# Patient Record
Sex: FEMALE | Race: BLACK OR AFRICAN AMERICAN | NOT HISPANIC OR LATINO | Employment: FULL TIME | RURAL
[De-identification: names, ages, dates, MRNs, and addresses within clinical notes are randomized per-mention and may not be internally consistent; named-entity substitution may affect disease eponyms.]

---

## 2021-06-16 PROBLEM — E66.811 OBESITY (BMI 30.0-34.9): Status: ACTIVE | Noted: 2021-06-16

## 2023-03-07 PROBLEM — E66.812 CLASS 2 OBESITY DUE TO EXCESS CALORIES WITH BODY MASS INDEX (BMI) OF 35.0 TO 35.9 IN ADULT: Status: ACTIVE | Noted: 2023-03-07

## 2024-08-05 ENCOUNTER — HOSPITAL ENCOUNTER (OUTPATIENT)
Dept: RADIOLOGY | Facility: HOSPITAL | Age: 49
Discharge: HOME | End: 2024-08-05
Attending: RADIOLOGY
Payer: COMMERCIAL

## 2024-08-05 DIAGNOSIS — Z12.31 VISIT FOR SCREENING MAMMOGRAM: ICD-10-CM

## 2024-08-05 DIAGNOSIS — R92.8 ABNORMAL MAMMOGRAM: ICD-10-CM

## 2024-08-05 PROCEDURE — 77067 SCR MAMMO BI INCL CAD: CPT | Mod: 26,,, | Performed by: RADIOLOGY

## 2024-08-05 PROCEDURE — 77063 BREAST TOMOSYNTHESIS BI: CPT | Mod: TC

## 2024-08-05 PROCEDURE — 76641 ULTRASOUND BREAST COMPLETE: CPT | Mod: 26,50,, | Performed by: RADIOLOGY

## 2024-08-05 PROCEDURE — 77063 BREAST TOMOSYNTHESIS BI: CPT | Mod: 26,,, | Performed by: RADIOLOGY

## 2024-08-05 PROCEDURE — 77067 SCR MAMMO BI INCL CAD: CPT | Mod: TC

## 2024-08-05 PROCEDURE — 76641 ULTRASOUND BREAST COMPLETE: CPT | Mod: TC,50

## 2024-08-06 RX ORDER — QUETIAPINE FUMARATE 25 MG/1
25 TABLET, FILM COATED ORAL NIGHTLY
Qty: 30 TABLET | Refills: 0 | Status: SHIPPED | OUTPATIENT
Start: 2024-08-06

## 2024-08-15 ENCOUNTER — OFFICE VISIT (OUTPATIENT)
Dept: FAMILY MEDICINE | Facility: CLINIC | Age: 49
End: 2024-08-15
Payer: COMMERCIAL

## 2024-08-15 VITALS
HEART RATE: 98 BPM | OXYGEN SATURATION: 99 % | SYSTOLIC BLOOD PRESSURE: 138 MMHG | BODY MASS INDEX: 35.52 KG/M2 | DIASTOLIC BLOOD PRESSURE: 88 MMHG | WEIGHT: 221 LBS | TEMPERATURE: 98 F | HEIGHT: 66 IN

## 2024-08-15 DIAGNOSIS — Z11.59 ENCOUNTER FOR HEPATITIS C SCREENING TEST FOR LOW RISK PATIENT: ICD-10-CM

## 2024-08-15 DIAGNOSIS — Z23 NEED FOR VACCINATION: ICD-10-CM

## 2024-08-15 DIAGNOSIS — M25.571 CHRONIC PAIN OF RIGHT ANKLE: ICD-10-CM

## 2024-08-15 DIAGNOSIS — G89.29 CHRONIC PAIN OF RIGHT ANKLE: ICD-10-CM

## 2024-08-15 DIAGNOSIS — I10 ESSENTIAL HYPERTENSION: Primary | ICD-10-CM

## 2024-08-15 PROCEDURE — 1159F MED LIST DOCD IN RCRD: CPT | Mod: CPTII,,, | Performed by: FAMILY MEDICINE

## 2024-08-15 PROCEDURE — 90715 TDAP VACCINE 7 YRS/> IM: CPT | Mod: ,,, | Performed by: FAMILY MEDICINE

## 2024-08-15 PROCEDURE — 80061 LIPID PANEL: CPT | Mod: ,,, | Performed by: CLINICAL MEDICAL LABORATORY

## 2024-08-15 PROCEDURE — 4010F ACE/ARB THERAPY RXD/TAKEN: CPT | Mod: CPTII,,, | Performed by: FAMILY MEDICINE

## 2024-08-15 PROCEDURE — 85025 COMPLETE CBC W/AUTO DIFF WBC: CPT | Mod: ,,, | Performed by: CLINICAL MEDICAL LABORATORY

## 2024-08-15 PROCEDURE — 3008F BODY MASS INDEX DOCD: CPT | Mod: CPTII,,, | Performed by: FAMILY MEDICINE

## 2024-08-15 PROCEDURE — 90471 IMMUNIZATION ADMIN: CPT | Mod: ,,, | Performed by: FAMILY MEDICINE

## 2024-08-15 PROCEDURE — 86803 HEPATITIS C AB TEST: CPT | Mod: ,,, | Performed by: CLINICAL MEDICAL LABORATORY

## 2024-08-15 PROCEDURE — 3079F DIAST BP 80-89 MM HG: CPT | Mod: CPTII,,, | Performed by: FAMILY MEDICINE

## 2024-08-15 PROCEDURE — 80053 COMPREHEN METABOLIC PANEL: CPT | Mod: ,,, | Performed by: CLINICAL MEDICAL LABORATORY

## 2024-08-15 PROCEDURE — 3075F SYST BP GE 130 - 139MM HG: CPT | Mod: CPTII,,, | Performed by: FAMILY MEDICINE

## 2024-08-15 PROCEDURE — 99214 OFFICE O/P EST MOD 30 MIN: CPT | Mod: 25,,, | Performed by: FAMILY MEDICINE

## 2024-08-15 RX ORDER — AMITRIPTYLINE HYDROCHLORIDE 25 MG/1
25 TABLET, FILM COATED ORAL NIGHTLY
Qty: 90 TABLET | Refills: 1 | Status: SHIPPED | OUTPATIENT
Start: 2024-08-15

## 2024-08-15 RX ORDER — HYDROCHLOROTHIAZIDE 12.5 MG/1
12.5 TABLET ORAL 2 TIMES DAILY
Qty: 180 TABLET | Refills: 1 | Status: SHIPPED | OUTPATIENT
Start: 2024-08-15

## 2024-08-15 RX ORDER — PROPRANOLOL HYDROCHLORIDE 20 MG/1
20 TABLET ORAL NIGHTLY
Qty: 90 TABLET | Refills: 1 | Status: SHIPPED | OUTPATIENT
Start: 2024-08-15

## 2024-08-15 RX ORDER — QUETIAPINE FUMARATE 25 MG/1
25 TABLET, FILM COATED ORAL NIGHTLY
Qty: 90 TABLET | Refills: 1 | Status: SHIPPED | OUTPATIENT
Start: 2024-08-15

## 2024-08-15 RX ORDER — AMLODIPINE AND BENAZEPRIL HYDROCHLORIDE 10; 20 MG/1; MG/1
1 CAPSULE ORAL DAILY
Qty: 90 CAPSULE | Refills: 1 | Status: SHIPPED | OUTPATIENT
Start: 2024-08-15

## 2024-08-16 LAB
ALBUMIN SERPL BCP-MCNC: 3.9 G/DL (ref 3.5–5)
ALBUMIN/GLOB SERPL: 1 {RATIO}
ALP SERPL-CCNC: 117 U/L (ref 39–100)
ALT SERPL W P-5'-P-CCNC: 22 U/L (ref 13–56)
ANION GAP SERPL CALCULATED.3IONS-SCNC: 9 MMOL/L (ref 7–16)
AST SERPL W P-5'-P-CCNC: 9 U/L (ref 15–37)
BASOPHILS # BLD AUTO: 0.02 K/UL (ref 0–0.2)
BASOPHILS NFR BLD AUTO: 0.2 % (ref 0–1)
BILIRUB SERPL-MCNC: 0.2 MG/DL (ref ?–1.2)
BUN SERPL-MCNC: 16 MG/DL (ref 7–18)
BUN/CREAT SERPL: 16 (ref 6–20)
CALCIUM SERPL-MCNC: 10 MG/DL (ref 8.5–10.1)
CHLORIDE SERPL-SCNC: 109 MMOL/L (ref 98–107)
CHOLEST SERPL-MCNC: 188 MG/DL (ref 0–200)
CHOLEST/HDLC SERPL: 2 {RATIO}
CO2 SERPL-SCNC: 27 MMOL/L (ref 21–32)
CREAT SERPL-MCNC: 0.98 MG/DL (ref 0.55–1.02)
DIFFERENTIAL METHOD BLD: ABNORMAL
EGFR (NO RACE VARIABLE) (RUSH/TITUS): 71 ML/MIN/1.73M2
EOSINOPHIL # BLD AUTO: 0.08 K/UL (ref 0–0.5)
EOSINOPHIL NFR BLD AUTO: 0.7 % (ref 1–4)
ERYTHROCYTE [DISTWIDTH] IN BLOOD BY AUTOMATED COUNT: 14.2 % (ref 11.5–14.5)
GLOBULIN SER-MCNC: 3.9 G/DL (ref 2–4)
GLUCOSE SERPL-MCNC: 93 MG/DL (ref 74–106)
HCT VFR BLD AUTO: 41 % (ref 38–47)
HCV AB SER QL: NORMAL
HDLC SERPL-MCNC: 94 MG/DL (ref 40–60)
HGB BLD-MCNC: 12.8 G/DL (ref 12–16)
IMM GRANULOCYTES # BLD AUTO: 0.04 K/UL (ref 0–0.04)
IMM GRANULOCYTES NFR BLD: 0.3 % (ref 0–0.4)
LDLC SERPL CALC-MCNC: 81 MG/DL
LYMPHOCYTES # BLD AUTO: 3.43 K/UL (ref 1–4.8)
LYMPHOCYTES NFR BLD AUTO: 28.4 % (ref 27–41)
MCH RBC QN AUTO: 31.6 PG (ref 27–31)
MCHC RBC AUTO-ENTMCNC: 31.2 G/DL (ref 32–36)
MCV RBC AUTO: 101.2 FL (ref 80–96)
MONOCYTES # BLD AUTO: 0.94 K/UL (ref 0–0.8)
MONOCYTES NFR BLD AUTO: 7.8 % (ref 2–6)
MPC BLD CALC-MCNC: 10.8 FL (ref 9.4–12.4)
NEUTROPHILS # BLD AUTO: 7.56 K/UL (ref 1.8–7.7)
NEUTROPHILS NFR BLD AUTO: 62.6 % (ref 53–65)
NONHDLC SERPL-MCNC: 94 MG/DL
NRBC # BLD AUTO: 0 X10E3/UL
NRBC, AUTO (.00): 0 %
PLATELET # BLD AUTO: 324 K/UL (ref 150–400)
POTASSIUM SERPL-SCNC: 3.9 MMOL/L (ref 3.5–5.1)
PROT SERPL-MCNC: 7.8 G/DL (ref 6.4–8.2)
RBC # BLD AUTO: 4.05 M/UL (ref 4.2–5.4)
SODIUM SERPL-SCNC: 141 MMOL/L (ref 136–145)
TRIGL SERPL-MCNC: 65 MG/DL (ref 35–150)
VLDLC SERPL-MCNC: 13 MG/DL
WBC # BLD AUTO: 12.07 K/UL (ref 4.5–11)

## 2024-08-19 NOTE — PROGRESS NOTES
Hmea Brown MD   [facility]  [address] [place] [address]     PATIENT NAME: Sree COLEMAN  : 1975  DATE: 8/15/24  MRN: [id number]      Billing Provider: Hema Brown MD  Level of Service: MO OFFICE/OUTPT VISIT, EST, LEVL IV, 30-39 MIN  Patient PCP Information       Provider PCP Type    Hema Brown MD General            Reason for Visit / Chief Complaint: Hypertension (6 month check up, labs and med refills) and Ankle Pain (C/o right ankle pain/swelling that started 2 months ago. She was seen on a Friday by Ana Pozo NP. Xray at [facility] showed mild osteoarthrosis. States she was given 2 shots and indomethacin. Requesting shots today.)         History of Present Illness / Problem Focused Workflow     Sree COLEMAN presents to the clinic with Hypertension (6 month check up, labs and med refills) and Ankle Pain (C/o right ankle pain/swelling that started 2 months ago. She was seen on a Friday by Ana Pozo NP. Xray at [facility] showed mild osteoarthrosis. States she was given 2 shots and indomethacin. Requesting shots today.)     HPI    Review of Systems     Review of Systems   Constitutional:  Negative for activity change, appetite change, fatigue and fever.   HENT:  Negative for nasal congestion, ear pain, hearing loss, sinus pressure/congestion and sore throat.    Respiratory:  Negative for cough, chest tightness and shortness of breath.    Cardiovascular:  Negative for chest pain and palpitations.   Gastrointestinal:  Negative for abdominal pain and fecal incontinence.   Genitourinary:  Negative for bladder incontinence and difficulty urinating.   Musculoskeletal:  Negative for arthralgias.   Integumentary:  Negative for rash.   Neurological:  Negative for dizziness and headaches.        Medical / Social / Family History     Past Medical History:   Diagnosis Date    Anxiety disorder, unspecified      Depression     Hypertension     Panic attack     Vasovagal attack 04/24/2024    Vasovagal attack occurred after office endocervical curettage and required emergency room evaluation - labs within normal limits as well as CT scan of the neck and had patient was discharged home; patient has a history of vasovagal attacks according to daughter.       Past Surgical History:   Procedure Laterality Date    EXCISION OF MEDIAL MENISCUS OF KNEE Left 08/10/2021    Procedure: MENISCECTOMY, KNEE, partial MEDIAL;  Surgeon: Erick Alonso MD;  Location: [facility];  Service: Orthopedics;  Laterality: Left;  PARTIAL MEDICAL MENISCECTOMY    KNEE ARTHROSCOPY W/ DEBRIDEMENT Left 08/10/2021    Procedure: ARTHROSCOPY, KNEE, WITH DEBRIDEMENT of patella;  Surgeon: Erick Alonso MD;  Location: [facility];  Service: Orthopedics;  Laterality: Left;  shaving of patella    KNEE ARTHROSCOPY W/ PLICA EXCISION Left 08/10/2021    Procedure: EXCISION, PLICA, KNEE, ARTHROSCOPIC;  Surgeon: Erick Alonso MD;  Location: [facility];  Service: Orthopedics;  Laterality: Left;  MSP EXCISION    RECONSTRUCTION OF ANTERIOR CRUCIATE LIGAMENT USING GRAFT Left 08/10/2021    Procedure: RECONSTRUCTION, KNEE, ACL, USING GRAFT TRANSFIX allograft;  Surgeon: Erick Alonso MD;  Location: [facility];  Service: Orthopedics;  Laterality: Left;    TUBAL LIGATION         Social History  Sree COLEMAN  reports that she has been smoking vaping w/o nicotine. She has never used smokeless tobacco. She reports current alcohol use of about 3.0 standard drinks of alcohol per week. She reports that she does not use drugs.    Family History  Sree COLEMAN  family history includes Hypertension in her mother and sister.    Medications and Allergies     Medications  Outpatient Medications Marked as Taking for the 8/15/24 encounter (Office Visit) with Hema Brown MD   Medication Sig Dispense Refill     [DISCONTINUED] amitriptyline (ELAVIL) 25 MG tablet Take 1 tablet (25 mg total) by mouth every evening. 90 tablet 3    [DISCONTINUED] amlodipine-benazepril 10-20mg (LOTREL) 10-20 mg per capsule Take 1 capsule by mouth once daily. 90 capsule 3    [DISCONTINUED] hydroCHLOROthiazide (HYDRODIURIL) 12.5 MG Tab Take 1 tablet (12.5 mg total) by mouth 2 (two) times a day. 180 tablet 3    [DISCONTINUED] propranoloL (INDERAL) 20 MG tablet Take 20 mg by mouth nightly.      [DISCONTINUED] QUEtiapine (SEROQUEL) 25 MG Tab Take 1 tablet (25 mg total) by mouth nightly. 30 tablet 0       Allergies  Review of patient's allergies indicates:  No Known Allergies    Physical Examination     Vitals:    08/15/24 1520   BP: 138/88   Pulse: 98   Temp: 98 °F (36.7 °C)     Physical Exam  Constitutional:       General: She is not in acute distress.     Appearance: She is not ill-appearing.   HENT:      Head: Normocephalic and atraumatic.      Right Ear: Tympanic membrane and ear canal normal.      Left Ear: Tympanic membrane and ear canal normal.      Nose: Nose normal. No congestion or rhinorrhea.   Eyes:      Pupils: Pupils are equal, round, and reactive to light.   Cardiovascular:      Rate and Rhythm: Normal rate and regular rhythm.      Pulses: Normal pulses.      Heart sounds: No murmur heard.  Pulmonary:      Effort: No respiratory distress.      Breath sounds: No wheezing, rhonchi or rales.   Abdominal:      General: Bowel sounds are normal.      Palpations: Abdomen is soft.      Tenderness: There is no abdominal tenderness.      Hernia: No hernia is present.   Musculoskeletal:         General: Tenderness (right lateral ankle) present.      Cervical back: Normal range of motion and neck supple.   Lymphadenopathy:      Cervical: No cervical adenopathy.   Skin:     General: Skin is warm and dry.   Neurological:      Mental Status: She is alert.   Psychiatric:         Behavior: Behavior normal.         Thought Content: Thought content  normal.          Assessment and Plan (including Health Maintenance)   :    Plan:         Health Maintenance Due   Topic Date Due    Pneumococcal Vaccines (Age 0-64) (1 of 2 - PCV) Never done       Problem List Items Addressed This Visit    None  Visit Diagnoses       Essential hypertension    -  Primary    Relevant Orders    CBC Auto Differential (Completed)    Comprehensive Metabolic Panel (Completed)    Lipid Panel (Completed)    Encounter for hepatitis C screening test for low risk patient        Relevant Orders    Hepatitis C Antibody (Completed)    Need for vaccination        Relevant Medications    Tdap (BOOSTRIX) vaccine injection 0.5 mL (Completed)    Chronic pain of right ankle              Essential hypertension  -     CBC Auto Differential; Future; Expected date: 08/15/2024  -     Comprehensive Metabolic Panel; Future; Expected date: 08/15/2024  -     Lipid Panel; Future; Expected date: 08/15/2024    Encounter for hepatitis C screening test for low risk patient  -     Hepatitis C Antibody; Future; Expected date: 08/15/2024    Need for vaccination  -     Tdap (BOOSTRIX) vaccine injection 0.5 mL    Chronic pain of right ankle    Other orders  -     amlodipine-benazepril 10-20mg (LOTREL) 10-20 mg per capsule; Take 1 capsule by mouth once daily.  Dispense: 90 capsule; Refill: 1  -     hydroCHLOROthiazide (HYDRODIURIL) 12.5 MG Tab; Take 1 tablet (12.5 mg total) by mouth 2 (two) times a day.  Dispense: 180 tablet; Refill: 1  -     amitriptyline (ELAVIL) 25 MG tablet; Take 1 tablet (25 mg total) by mouth every evening.  Dispense: 90 tablet; Refill: 1  -     QUEtiapine (SEROQUEL) 25 MG Tab; Take 1 tablet (25 mg total) by mouth nightly.  Dispense: 90 tablet; Refill: 1  -     propranoloL (INDERAL) 20 MG tablet; Take 1 tablet (20 mg total) by mouth nightly.  Dispense: 90 tablet; Refill: 1       Health Maintenance Topics with due status: Not Due       Topic Last Completion Date    Influenza Vaccine 10/13/2022     Colorectal Cancer Screening 02/17/2023    Hemoglobin A1c (Diabetic Prevention Screening) 07/10/2023    Cervical Cancer Screening 04/09/2024    Mammogram 08/05/2024    TETANUS VACCINE 08/15/2024    Lipid Panel 08/15/2024       Procedures     Future Appointments   Date Time Provider Department Center   9/9/2024  2:40 PM Garrett Conner MD [facility] CARD [facility]   1/9/2025  1:30 PM Hu Shea MD [facility] NEURO [facility]   9/11/2025  3:00 PM [facility] MAMMO1 [facility] MAMMO [facility]   9/11/2025  3:20 PM [facility] US2 [facility]        No follow-ups on file.       Signature:  Hema Brown MD  [facility]  [address] [facility]   [place] [address]  [phone] Phone  [phone] Fax    Date of encounter: 8/15/24

## 2024-09-17 ENCOUNTER — OFFICE VISIT (OUTPATIENT)
Dept: FAMILY MEDICINE | Facility: CLINIC | Age: 49
End: 2024-09-17
Payer: COMMERCIAL

## 2024-09-17 VITALS
BODY MASS INDEX: 35.84 KG/M2 | HEART RATE: 88 BPM | OXYGEN SATURATION: 96 % | WEIGHT: 223 LBS | HEIGHT: 66 IN | TEMPERATURE: 99 F | SYSTOLIC BLOOD PRESSURE: 136 MMHG | DIASTOLIC BLOOD PRESSURE: 98 MMHG

## 2024-09-17 DIAGNOSIS — M25.571 CHRONIC PAIN OF RIGHT ANKLE: Primary | ICD-10-CM

## 2024-09-17 DIAGNOSIS — J01.00 ACUTE NON-RECURRENT MAXILLARY SINUSITIS: ICD-10-CM

## 2024-09-17 DIAGNOSIS — G89.29 CHRONIC PAIN OF RIGHT ANKLE: Primary | ICD-10-CM

## 2024-09-17 RX ORDER — CEFTRIAXONE 1 G/1
1 INJECTION, POWDER, FOR SOLUTION INTRAMUSCULAR; INTRAVENOUS
Status: COMPLETED | OUTPATIENT
Start: 2024-09-17 | End: 2024-09-17

## 2024-09-17 RX ORDER — ROSUVASTATIN CALCIUM 10 MG/1
10 TABLET, COATED ORAL NIGHTLY
COMMUNITY
Start: 2024-08-21

## 2024-09-17 RX ORDER — BENAZEPRIL HYDROCHLORIDE 10 MG/1
10 TABLET ORAL DAILY
COMMUNITY

## 2024-09-17 RX ORDER — METHYLPREDNISOLONE ACETATE 80 MG/ML
40 INJECTION, SUSPENSION INTRA-ARTICULAR; INTRALESIONAL; INTRAMUSCULAR; SOFT TISSUE
Status: COMPLETED | OUTPATIENT
Start: 2024-09-17 | End: 2024-09-17

## 2024-09-17 RX ORDER — DEXAMETHASONE SODIUM PHOSPHATE 4 MG/ML
4 INJECTION, SOLUTION INTRA-ARTICULAR; INTRALESIONAL; INTRAMUSCULAR; INTRAVENOUS; SOFT TISSUE
Status: COMPLETED | OUTPATIENT
Start: 2024-09-17 | End: 2024-09-17

## 2024-09-17 RX ORDER — CEFUROXIME AXETIL 250 MG/1
250 TABLET ORAL EVERY 12 HOURS
Qty: 20 TABLET | Refills: 0 | Status: SHIPPED | OUTPATIENT
Start: 2024-09-17

## 2024-09-17 RX ORDER — DEXCHLORPHENIRAMINE MALEATE, DEXTROMETHORPHAN HBR, PHENYLEPHRINE HCL 1; 10; 5 MG/5ML; MG/5ML; MG/5ML
10 SYRUP ORAL EVERY 6 HOURS PRN
Qty: 240 ML | Refills: 0 | Status: SHIPPED | OUTPATIENT
Start: 2024-09-17

## 2024-09-17 RX ADMIN — METHYLPREDNISOLONE ACETATE 40 MG: 80 INJECTION, SUSPENSION INTRA-ARTICULAR; INTRALESIONAL; INTRAMUSCULAR; SOFT TISSUE at 09:09

## 2024-09-17 RX ADMIN — DEXAMETHASONE SODIUM PHOSPHATE 4 MG: 4 INJECTION, SOLUTION INTRA-ARTICULAR; INTRALESIONAL; INTRAMUSCULAR; INTRAVENOUS; SOFT TISSUE at 09:09

## 2024-09-17 RX ADMIN — CEFTRIAXONE 1 G: 1 INJECTION, POWDER, FOR SOLUTION INTRAMUSCULAR; INTRAVENOUS at 09:09

## 2024-09-27 NOTE — PROGRESS NOTES
Hema Brown MD   [facility]  [address] [place] [address]     PATIENT NAME: Sree COLEMAN  : 1975  DATE: 24  MRN: [id number]      Billing Provider: Hema Brown MD  Level of Service: MD OFFICE/OUTPT VISIT, EST, LEVL III, 20-29 MIN  Patient PCP Information       Provider PCP Type    Hema Brown MD General            Reason for Visit / Chief Complaint: Sore Throat (Sore throat, headache, cough, hoarse, Patient states chest feels like its caving in x5 days ) and Ankle Pain (Pain to right ankle patient states its worse with standing and walking. Patient reports swelling worse at times. Patient denies any injury. Patient had xray done. Patient states pain started mid July. )         History of Present Illness / Problem Focused Workflow     Sree COLEMAN presents to the clinic with Sore Throat (Sore throat, headache, cough, hoarse, Patient states chest feels like its caving in x5 days ) and Ankle Pain (Pain to right ankle patient states its worse with standing and walking. Patient reports swelling worse at times. Patient denies any injury. Patient had xray done. Patient states pain started mid July. )     HPI    Review of Systems     Review of Systems   Constitutional:  Negative for activity change, appetite change, fatigue and fever.   HENT:  Positive for nasal congestion, sinus pressure/congestion and sore throat. Negative for ear pain and hearing loss.    Respiratory:  Positive for cough. Negative for chest tightness and shortness of breath.    Cardiovascular:  Negative for chest pain and palpitations.   Gastrointestinal:  Negative for abdominal pain and fecal incontinence.   Genitourinary:  Negative for bladder incontinence and difficulty urinating.   Musculoskeletal:  Negative for arthralgias.   Integumentary:  Negative for rash.   Neurological:  Negative for dizziness and headaches.        Medical / Social / Family History      Past Medical History:   Diagnosis Date    Anxiety disorder, unspecified     Depression     Hypertension     Panic attack     Vasovagal attack 04/24/2024    Vasovagal attack occurred after office endocervical curettage and required emergency room evaluation - labs within normal limits as well as CT scan of the neck and had patient was discharged home; patient has a history of vasovagal attacks according to daughter.       Past Surgical History:   Procedure Laterality Date    EXCISION OF MEDIAL MENISCUS OF KNEE Left 08/10/2021    Procedure: MENISCECTOMY, KNEE, partial MEDIAL;  Surgeon: Erick Alonso MD;  Location: [facility];  Service: Orthopedics;  Laterality: Left;  PARTIAL MEDICAL MENISCECTOMY    KNEE ARTHROSCOPY W/ DEBRIDEMENT Left 08/10/2021    Procedure: ARTHROSCOPY, KNEE, WITH DEBRIDEMENT of patella;  Surgeon: Erick Alonso MD;  Location: [facility];  Service: Orthopedics;  Laterality: Left;  shaving of patella    KNEE ARTHROSCOPY W/ PLICA EXCISION Left 08/10/2021    Procedure: EXCISION, PLICA, KNEE, ARTHROSCOPIC;  Surgeon: Erick Alonso MD;  Location: [facility];  Service: Orthopedics;  Laterality: Left;  MSP EXCISION    RECONSTRUCTION OF ANTERIOR CRUCIATE LIGAMENT USING GRAFT Left 08/10/2021    Procedure: RECONSTRUCTION, KNEE, ACL, USING GRAFT TRANSFIX allograft;  Surgeon: Erick Alonso MD;  Location: [facility];  Service: Orthopedics;  Laterality: Left;    TUBAL LIGATION         Social History  Sree COLEMAN  reports that she has been smoking vaping w/o nicotine. She has never used smokeless tobacco. She reports current alcohol use of about 3.0 standard drinks of alcohol per week. She reports that she does not use drugs.    Family History  Sree COLEMAN  family history includes Hypertension in her mother and sister.    Medications and Allergies     Medications  Outpatient Medications Marked as Taking for the 9/17/24 encounter  (Office Visit) with Hema Brown MD   Medication Sig Dispense Refill    amitriptyline (ELAVIL) 25 MG tablet Take 1 tablet (25 mg total) by mouth every evening. 90 tablet 1    benazepriL (LOTENSIN) 10 MG tablet Take 10 mg by mouth once daily.      hydroCHLOROthiazide (HYDRODIURIL) 12.5 MG Tab Take 1 tablet (12.5 mg total) by mouth 2 (two) times a day. 180 tablet 1    propranoloL (INDERAL) 20 MG tablet Take 1 tablet (20 mg total) by mouth nightly. 90 tablet 1    QUEtiapine (SEROQUEL) 25 MG Tab Take 1 tablet (25 mg total) by mouth nightly. 90 tablet 1    rosuvastatin (CRESTOR) 10 MG tablet Take 10 mg by mouth every evening.      [DISCONTINUED] amlodipine-benazepril 10-20mg (LOTREL) 10-20 mg per capsule Take 1 capsule by mouth once daily. 90 capsule 1       Allergies  Review of patient's allergies indicates:  No Known Allergies    Physical Examination     Vitals:    09/17/24 0850   BP: (!) 136/98   Pulse:    Temp:      Physical Exam  Constitutional:       Appearance: Normal appearance.   HENT:      Head: Normocephalic and atraumatic.      Right Ear: Tympanic membrane normal.      Left Ear: Tympanic membrane normal.      Nose: Congestion and rhinorrhea present.      Mouth/Throat:      Pharynx: Posterior oropharyngeal erythema present.   Eyes:      Pupils: Pupils are equal, round, and reactive to light.   Cardiovascular:      Rate and Rhythm: Normal rate and regular rhythm.      Pulses: Normal pulses.      Heart sounds: Normal heart sounds.   Pulmonary:      Breath sounds: No wheezing or rhonchi.   Abdominal:      Palpations: Abdomen is soft.   Musculoskeletal:         General: Tenderness (right lateral ankle) present.   Lymphadenopathy:      Cervical: Cervical adenopathy present.   Skin:     General: Skin is warm and dry.   Neurological:      Mental Status: She is alert.          Assessment and Plan (including Health Maintenance)   :    Plan:         Health Maintenance Due   Topic Date Due    Pneumococcal  Vaccines (Age 0-64) (1 of 2 - PCV) Never done    Influenza Vaccine (1) 09/01/2024       Problem List Items Addressed This Visit    None  Visit Diagnoses       Chronic pain of right ankle    -  Primary    Acute non-recurrent maxillary sinusitis              Chronic pain of right ankle    Acute non-recurrent maxillary sinusitis    Other orders  -     dexAMETHasone injection 4 mg  -     methylPREDNISolone acetate injection 40 mg  -     cefTRIAXone injection 1 g  -     dexchlorphen-phenylephrine-DM (POLYTUSSIN DM,DEXCHLORPHENRMN,) 1-5-10 mg/5 mL Syrp; Take 10 mLs by mouth every 6 (six) hours as needed (cough).  Dispense: 240 mL; Refill: 0  -     cefUROXime (CEFTIN) 250 MG tablet; Take 1 tablet (250 mg total) by mouth every 12 (twelve) hours.  Dispense: 20 tablet; Refill: 0       Health Maintenance Topics with due status: Not Due       Topic Last Completion Date    Colorectal Cancer Screening 02/17/2023    Hemoglobin A1c (Diabetic Prevention Screening) 07/10/2023    Cervical Cancer Screening 04/09/2024    Mammogram 08/05/2024    TETANUS VACCINE 08/15/2024    Lipid Panel 08/15/2024       Procedures     Future Appointments   Date Time Provider Department Center   10/1/2024  8:00 AM NURSE, [facility] FAMILY MEDICINE [facility]   1/9/2025  1:30 PM Hu Shea MD [facility] NEURO [facility]   9/11/2025  3:00 PM [facility] MAMMO1 [facility] MAMMO [facility]   9/11/2025  3:20 PM [facility] US2 [facility]        No follow-ups on file.       Signature:  Hema Brown MD  [facility]  [address] [place] [address]  [phone] Phone  [phone] Fax    Date of encounter: 9/17/24

## 2025-01-07 ENCOUNTER — OFFICE VISIT (OUTPATIENT)
Dept: FAMILY MEDICINE | Facility: CLINIC | Age: 50
End: 2025-01-07
Payer: COMMERCIAL

## 2025-01-07 VITALS
SYSTOLIC BLOOD PRESSURE: 120 MMHG | DIASTOLIC BLOOD PRESSURE: 84 MMHG | BODY MASS INDEX: 36.19 KG/M2 | OXYGEN SATURATION: 100 % | HEIGHT: 66 IN | WEIGHT: 225.19 LBS | TEMPERATURE: 98 F

## 2025-01-07 DIAGNOSIS — K59.00 CONSTIPATION, UNSPECIFIED CONSTIPATION TYPE: ICD-10-CM

## 2025-01-07 DIAGNOSIS — M25.569 KNEE PAIN, UNSPECIFIED CHRONICITY, UNSPECIFIED LATERALITY: Primary | ICD-10-CM

## 2025-01-07 DIAGNOSIS — M17.12 PRIMARY OSTEOARTHRITIS OF LEFT KNEE: ICD-10-CM

## 2025-01-07 PROCEDURE — 3008F BODY MASS INDEX DOCD: CPT | Mod: CPTII,,, | Performed by: FAMILY MEDICINE

## 2025-01-07 PROCEDURE — 3079F DIAST BP 80-89 MM HG: CPT | Mod: CPTII,,, | Performed by: FAMILY MEDICINE

## 2025-01-07 PROCEDURE — 3074F SYST BP LT 130 MM HG: CPT | Mod: CPTII,,, | Performed by: FAMILY MEDICINE

## 2025-01-07 PROCEDURE — 99213 OFFICE O/P EST LOW 20 MIN: CPT | Mod: 25,,, | Performed by: FAMILY MEDICINE

## 2025-01-07 PROCEDURE — 20610 DRAIN/INJ JOINT/BURSA W/O US: CPT | Mod: LT,,, | Performed by: FAMILY MEDICINE

## 2025-01-07 RX ADMIN — METHYLPREDNISOLONE ACETATE 40 MG: 80 INJECTION, SUSPENSION INTRA-ARTICULAR; INTRALESIONAL; INTRAMUSCULAR; SOFT TISSUE at 03:01

## 2025-01-07 NOTE — PROGRESS NOTES
Hema Brown MD   [facility]  [address] [place] [address]     PATIENT NAME: Sree COLEMAN  : 1975  DATE: 25  MRN: [id number]      Billing Provider: Hema Brown MD  Level of Service: [place] OFFICE/OUTPT VISIT, EST, LEVL III, 20-29 MIN  Patient PCP Information       Provider PCP Type    Hema Brown MD General            Reason for Visit / Chief Complaint: Knee Pain (Left knee pain. States it pops from time to time. Patient states worse after standing long periods of time at work. Patient had ACL surgery about 2 years ago. ) and Constipation (Patient states she has had some constipation. States she has taken miralax for about a week. Patient states it been about a week since she had a BM. )         History of Present Illness / Problem Focused Workflow     Sree COLEMAN presents to the clinic with Knee Pain (Left knee pain. States it pops from time to time. Patient states worse after standing long periods of time at work. Patient had ACL surgery about 2 years ago. ) and Constipation (Patient states she has had some constipation. States she has taken miralax for about a week. Patient states it been about a week since she had a BM. )     HPI    Review of Systems     Review of Systems   Constitutional:  Negative for activity change, appetite change, fatigue and fever.   HENT:  Negative for nasal congestion, ear pain, hearing loss, sinus pressure/congestion and sore throat.    Respiratory:  Negative for cough, chest tightness and shortness of breath.    Cardiovascular:  Negative for chest pain and palpitations.   Gastrointestinal:  Positive for abdominal pain and change in bowel habit. Negative for fecal incontinence.   Genitourinary:  Negative for bladder incontinence and difficulty urinating.   Musculoskeletal:  Positive for gait problem and leg pain (left knee as per hpi). Negative for arthralgias.   Integumentary:  Negative for rash.    Neurological:  Negative for dizziness and headaches.        Medical / Social / Family History     Past Medical History:   Diagnosis Date    Anxiety disorder, unspecified     Depression     Hypertension     Panic attack     Vasovagal attack 04/24/2024    Vasovagal attack occurred after office endocervical curettage and required emergency room evaluation - labs within normal limits as well as CT scan of the neck and had patient was discharged home; patient has a history of vasovagal attacks according to daughter.       Past Surgical History:   Procedure Laterality Date    EXCISION OF MEDIAL MENISCUS OF KNEE Left 08/10/2021    Procedure: MENISCECTOMY, KNEE, partial MEDIAL;  Surgeon: Erick Alonso MD;  Location: [facility] OR;  Service: Orthopedics;  Laterality: Left;  PARTIAL MEDICAL MENISCECTOMY    KNEE ARTHROSCOPY W/ DEBRIDEMENT Left 08/10/2021    Procedure: ARTHROSCOPY, KNEE, WITH DEBRIDEMENT of patella;  Surgeon: Erick Alonso MD;  Location: [facility] OR;  Service: Orthopedics;  Laterality: Left;  shaving of patella    KNEE ARTHROSCOPY W/ PLICA EXCISION Left 08/10/2021    Procedure: EXCISION, PLICA, KNEE, ARTHROSCOPIC;  Surgeon: Erick Alonso MD;  Location: [facility] OR;  Service: Orthopedics;  Laterality: Left;  MSP EXCISION    RECONSTRUCTION OF ANTERIOR CRUCIATE LIGAMENT USING GRAFT Left 08/10/2021    Procedure: RECONSTRUCTION, KNEE, ACL, USING GRAFT TRANSFIX allograft;  Surgeon: Erick Alonso MD;  Location: [facility];  Service: Orthopedics;  Laterality: Left;    TUBAL LIGATION         Social History  Sree COLEMAN  reports that she has been smoking vaping w/o nicotine. She has never used smokeless tobacco. She reports current alcohol use of about 3.0 standard drinks of alcohol per week. She reports that she does not use drugs.    Family History  Sree COLEMAN  family history includes Hypertension in her mother and sister.    Medications  and Allergies     Medications  Outpatient Medications Marked as Taking for the 1/7/25 encounter (Office Visit) with Hema Brown MD   Medication Sig Dispense Refill    benazepriL (LOTENSIN) 10 MG tablet Take 10 mg by mouth once daily.      hydroCHLOROthiazide (HYDRODIURIL) 12.5 MG Tab Take 1 tablet (12.5 mg total) by mouth 2 (two) times a day. 180 tablet 1    propranoloL (INDERAL) 20 MG tablet Take 1 tablet (20 mg total) by mouth nightly. 90 tablet 1    QUEtiapine (SEROQUEL) 25 MG Tab Take 1 tablet (25 mg total) by mouth nightly. 90 tablet 1    rosuvastatin (CRESTOR) 10 MG tablet Take 10 mg by mouth every evening.      [DISCONTINUED] amitriptyline (ELAVIL) 25 MG tablet Take 1 tablet (25 mg total) by mouth every evening. 90 tablet 1       Allergies  Review of patient's allergies indicates:  No Known Allergies    Physical Examination     Vitals:    01/07/25 1536   BP: 120/84   Temp: 98.1 °F (36.7 °C)     Physical Exam  Constitutional:       General: She is not in acute distress.     Appearance: She is not ill-appearing.   HENT:      Head: Normocephalic and atraumatic.      Right Ear: Tympanic membrane and ear canal normal.      Left Ear: Tympanic membrane and ear canal normal.      Nose: Nose normal. No congestion or rhinorrhea.   Eyes:      Pupils: Pupils are equal, round, and reactive to light.   Cardiovascular:      Rate and Rhythm: Normal rate and regular rhythm.      Pulses: Normal pulses.      Heart sounds: No murmur heard.  Pulmonary:      Effort: No respiratory distress.      Breath sounds: No wheezing, rhonchi or rales.   Abdominal:      General: Bowel sounds are normal.      Palpations: Abdomen is soft.      Tenderness: There is no abdominal tenderness.      Hernia: No hernia is present.   Musculoskeletal:         General: Tenderness present.      Cervical back: Normal range of motion and neck supple.   Lymphadenopathy:      Cervical: No cervical adenopathy.   Skin:     General: Skin is warm and  dry.   Neurological:      Mental Status: She is alert.   Psychiatric:         Behavior: Behavior normal.         Thought Content: Thought content normal.          Assessment and Plan (including Health Maintenance)   :    Plan:         Health Maintenance Due   Topic Date Due    Pneumococcal Vaccines (Age 0-49) (1 of 2 - PCV) Never done    Influenza Vaccine (1) 09/01/2024       Problem List Items Addressed This Visit          GI    Constipation     Other Visit Diagnoses       Knee pain, unspecified chronicity, unspecified laterality    -  Primary    Relevant Orders    X-Ray Knee AP LAT with [place] Left (Completed)    Primary osteoarthritis of left knee              Knee pain, unspecified chronicity, unspecified laterality  -     X-Ray Knee AP LAT with [place] Left; Future; Expected date: 01/07/2025    Constipation, unspecified constipation type    Primary osteoarthritis of left knee    Other orders  -     Large Joint Aspiration/Injection       Health Maintenance Topics with due status: Not Due       Topic Last Completion Date    Colorectal Cancer Screening 02/17/2023    Hemoglobin A1c (Diabetic Prevention Screening) 07/10/2023    Cervical Cancer Screening 04/09/2024    Mammogram 08/05/2024    TETANUS VACCINE 08/15/2024    Lipid Panel 08/15/2024    RSV Vaccine (Age 60+ and Pregnant patients) Not Due       Large Joint Aspiration/Injection: L knee    Date/Time: 1/7/2025 3:00 PM    Performed by: Hema Brown MD  Authorized by: Hema Brown MD    Consent Done?:  Yes (Written)  Indications:  Arthritis  Site marked: the procedure site was marked      Local anesthesia used?: Yes    Local anesthetic:  Bupivacaine 0.25% without epinephrine  Anesthetic total (ml):  1      Details:  Needle Size:  22 G  Ultrasonic Guidance for needle placement?: No    Approach:  Anterolateral  Location:  Knee  Site:  L knee  Medications:  40 mg methylPREDNISolone acetate 80 mg/mL  Patient tolerance:  Patient tolerated the  procedure well with no immediate complications       Future Appointments   Date Time Provider Department Center   7/10/2025  3:45 PM Hu Shea MD [facility]URO [facility]   9/11/2025  3:00 PM [facility] MAMMO1 [facility] MAMMO [facility]   9/11/2025  3:20 PM [facility] US2 [facility] US [facility]        No follow-ups on file.       Signature:  Hema Brown MD  [facility]  [address] [place] [address]  [phone] Phone  [phone] Fax    Date of encounter: 1/7/25

## 2025-01-08 RX ORDER — LACTULOSE 10 G/15ML
10 SOLUTION ORAL; RECTAL 2 TIMES DAILY
COMMUNITY
End: 2025-01-08 | Stop reason: SDUPTHER

## 2025-01-08 RX ORDER — LACTULOSE 10 G/15ML
10 SOLUTION ORAL; RECTAL 2 TIMES DAILY
Qty: 420 ML | Refills: 0 | Status: SHIPPED | OUTPATIENT
Start: 2025-01-08 | End: 2025-01-22

## 2025-01-08 NOTE — TELEPHONE ENCOUNTER
----- Message from Kym sent at 1/8/2025 10:07 AM CST -----  Rx for constipation was not sent in to pharmacy after 01/09/25 office visit. [facility]. [phone]

## 2025-01-09 ENCOUNTER — OFFICE VISIT (OUTPATIENT)
Dept: NEUROLOGY | Facility: CLINIC | Age: 50
End: 2025-01-09
Payer: COMMERCIAL

## 2025-01-09 VITALS
DIASTOLIC BLOOD PRESSURE: 88 MMHG | SYSTOLIC BLOOD PRESSURE: 130 MMHG | RESPIRATION RATE: 18 BRPM | OXYGEN SATURATION: 100 % | WEIGHT: 225 LBS | BODY MASS INDEX: 36.16 KG/M2 | HEART RATE: 86 BPM | HEIGHT: 66 IN

## 2025-01-09 DIAGNOSIS — R55 SYNCOPE AND COLLAPSE: Primary | ICD-10-CM

## 2025-01-09 PROCEDURE — 99214 OFFICE O/P EST MOD 30 MIN: CPT | Mod: S$PBB,,, | Performed by: PSYCHIATRY & NEUROLOGY

## 2025-01-09 PROCEDURE — 1159F MED LIST DOCD IN RCRD: CPT | Mod: ,,, | Performed by: PSYCHIATRY & NEUROLOGY

## 2025-01-09 PROCEDURE — 3008F BODY MASS INDEX DOCD: CPT | Mod: ,,, | Performed by: PSYCHIATRY & NEUROLOGY

## 2025-01-09 PROCEDURE — 99999 PR PBB SHADOW E&M-EST. PATIENT-LVL IV: CPT | Mod: PBBFAC,,, | Performed by: PSYCHIATRY & NEUROLOGY

## 2025-01-09 PROCEDURE — 1160F RVW MEDS BY RX/DR IN RCRD: CPT | Mod: ,,, | Performed by: PSYCHIATRY & NEUROLOGY

## 2025-01-09 PROCEDURE — 3075F SYST BP GE 130 - 139MM HG: CPT | Mod: ,,, | Performed by: PSYCHIATRY & NEUROLOGY

## 2025-01-09 PROCEDURE — 99214 OFFICE O/P EST MOD 30 MIN: CPT | Mod: PBBFAC | Performed by: PSYCHIATRY & NEUROLOGY

## 2025-01-09 PROCEDURE — 3079F DIAST BP 80-89 MM HG: CPT | Mod: ,,, | Performed by: PSYCHIATRY & NEUROLOGY

## 2025-01-09 RX ORDER — AMITRIPTYLINE HYDROCHLORIDE 50 MG/1
50 TABLET, FILM COATED ORAL NIGHTLY
Qty: 90 TABLET | Refills: 3 | Status: SHIPPED | OUTPATIENT
Start: 2025-01-09

## 2025-01-09 NOTE — PROGRESS NOTES
Subjective:       Patient ID: Sree COLEMAN is a 49 y.o. female     Chief Complaint:    Chief Complaint   Patient presents with    syncope and collapse     Pt. States not sleeping with bad headache.doing good.        Allergies:  Patient has no known allergies.    Current Medications:    Outpatient Encounter Medications as of 1/9/2025   Medication Sig Dispense Refill    amitriptyline (ELAVIL) 25 MG tablet Take 1 tablet (25 mg total) by mouth every evening. 90 tablet 1    benazepriL (LOTENSIN) 10 MG tablet Take 10 mg by mouth once daily.      hydroCHLOROthiazide (HYDRODIURIL) 12.5 MG Tab Take 1 tablet (12.5 mg total) by mouth 2 (two) times a day. 180 tablet 1    lactulose (CHRONULAC) 10 gram/15 mL solution Take 15 mLs (10 g total) by mouth 2 (two) times daily. for 14 days 420 mL 0    propranoloL (INDERAL) 20 MG tablet Take 1 tablet (20 mg total) by mouth nightly. 90 tablet 1    QUEtiapine (SEROQUEL) 25 MG Tab Take 1 tablet (25 mg total) by mouth nightly. 90 tablet 1    rosuvastatin (CRESTOR) 10 MG tablet Take 10 mg by mouth every evening.      cefUROXime (CEFTIN) 250 MG tablet Take 1 tablet (250 mg total) by mouth every 12 (twelve) hours. (Patient not taking: Reported on 1/9/2025) 20 tablet 0    clindamycin (CLEOCIN T) 1 % lotion Apply topically every morning. (Patient not taking: Reported on 8/15/2024)      dexchlorphen-phenylephrine-DM (POLYTUSSIN DM,DEXCHLORPHENRMN,) 1-5-10 mg/5 mL Syrp Take 10 mLs by mouth every 6 (six) hours as needed (cough). (Patient not taking: Reported on 1/9/2025) 240 mL 0    [DISCONTINUED] lactulose (CHRONULAC) 10 gram/15 mL solution Take 10 g by mouth 2 (two) times daily.       No facility-administered encounter medications on file as of 1/9/2025.       History of Present Illness  50 yo BF w/ prev syncope and collapse   NL prev syncope w/u   Elavil 25 mg qhs worked well for HEADACHE relief but may need incr dosage       Past Medical History:   Diagnosis Date     Anxiety disorder, unspecified     Depression     Hypertension     Panic attack     Vasovagal attack 04/24/2024    Vasovagal attack occurred after office endocervical curettage and required emergency room evaluation - labs within normal limits as well as CT scan of the neck and had patient was discharged home; patient has a history of vasovagal attacks according to daughter.       Past Surgical History:   Procedure Laterality Date    EXCISION OF MEDIAL MENISCUS OF KNEE Left 08/10/2021    Procedure: MENISCECTOMY, KNEE, partial MEDIAL;  Surgeon: Erick Alonso MD;  Location: [facility];  Service: Orthopedics;  Laterality: Left;  PARTIAL MEDICAL MENISCECTOMY    KNEE ARTHROSCOPY W/ DEBRIDEMENT Left 08/10/2021    Procedure: ARTHROSCOPY, KNEE, WITH DEBRIDEMENT of patella;  Surgeon: Erick Alonso MD;  Location: [facility];  Service: Orthopedics;  Laterality: Left;  shaving of patella    KNEE ARTHROSCOPY W/ PLICA EXCISION Left 08/10/2021    Procedure: EXCISION, PLICA, KNEE, ARTHROSCOPIC;  Surgeon: Erick Alonso MD;  Location: [facility];  Service: Orthopedics;  Laterality: Left;  MSP EXCISION    RECONSTRUCTION OF ANTERIOR CRUCIATE LIGAMENT USING GRAFT Left 08/10/2021    Procedure: RECONSTRUCTION, KNEE, ACL, USING GRAFT TRANSFIX allograft;  Surgeon: Erick Alonso MD;  Location: [facility];  Service: Orthopedics;  Laterality: Left;    TUBAL LIGATION         Social History  Ms. COLEMAN  reports that she has been smoking vaping w/o nicotine. She has never used smokeless tobacco. She reports current alcohol use of about 3.0 standard drinks of alcohol per week. She reports that she does not use drugs.    Family History  Ms.'s COLEMAN family history includes Hypertension in her mother and sister.    Review of Systems  Review of Systems   Neurological:  Positive for headaches.   Psychiatric/Behavioral:  The patient is nervous/anxious.    All other systems reviewed and  "are negative.   Objective:   /88 (BP Location: Right arm, Patient Position: Sitting)   Pulse 86   Resp 18   Ht 5' 6" (1.676 m)   Wt 102.1 kg (225 lb)   SpO2 100%   BMI 36.32 kg/m²    NEUROLOGICAL EXAMINATION:     MENTAL STATUS   Oriented to person, place, and time.   Level of consciousness: alert  Knowledge: good.     CRANIAL NERVES   Cranial nerves II through XII intact.     MOTOR EXAM     Strength   Strength 5/5 throughout.     GAIT AND COORDINATION     Gait  Gait: normal     Physical Exam  Vitals reviewed.   Constitutional:       Appearance: She is obese.   Neurological:      General: No focal deficit present.      Mental Status: She is alert and oriented to person, place, and time. Mental status is at baseline.      Cranial Nerves: Cranial nerves 2-12 are intact.      Motor: Motor strength is normal.     Gait: Gait is intact.        Assessment:     There are no diagnoses linked to this encounter.     Primary Diagnosis and ICD10  No primary diagnosis found.    Plan:     There are no Patient Instructions on file for this visit.    There are no discontinued medications.    Requested Prescriptions      No prescriptions requested or ordered in this encounter       "

## 2025-01-28 RX ORDER — METHYLPREDNISOLONE ACETATE 80 MG/ML
40 INJECTION, SUSPENSION INTRA-ARTICULAR; INTRALESIONAL; INTRAMUSCULAR; SOFT TISSUE
Status: DISCONTINUED | OUTPATIENT
Start: 2025-01-07 | End: 2025-01-28 | Stop reason: HOSPADM

## 2025-03-13 RX ORDER — ROSUVASTATIN CALCIUM 10 MG/1
10 TABLET, COATED ORAL NIGHTLY
Qty: 30 TABLET | Refills: 0 | Status: SHIPPED | OUTPATIENT
Start: 2025-03-13

## 2025-03-13 RX ORDER — QUETIAPINE FUMARATE 25 MG/1
25 TABLET, FILM COATED ORAL NIGHTLY
Qty: 30 TABLET | Refills: 0 | Status: SHIPPED | OUTPATIENT
Start: 2025-03-13

## 2025-03-13 NOTE — TELEPHONE ENCOUNTER
----- Message from Key sent at 3/13/2025  9:07 AM CDT -----  Regarding: RX Refill  Who Called: Sree COLEMANRefill or New Rx:RefillRX Name and Strength:QUEtiapine (SEROQUEL) 25 MG TabHow is the patient currently taking it? (ex. 1XDay):1xdayIs this a 30 day or 90 day RX:90RX Name and Strength:rosuvastatin (CRESTOR) 10 MG tabletHow is the patient currently taking it? (ex. 1XDay):1xdayIs this a 30 day or 90 day RX:90List of preferred pharmacies on file (remove unneeded): [unfilled][facility] [place] - [id number] [facility] [place] [address]Dsuxr: [phone] Fax: [phone]: Not open 24 hoursOrdering Provider:Dr. GarnerdPreferred Method of Contact: Phone CallPatient's Preferred Phone Number on File: [phone]

## 2025-03-20 ENCOUNTER — OFFICE VISIT (OUTPATIENT)
Dept: FAMILY MEDICINE | Facility: CLINIC | Age: 50
End: 2025-03-20
Payer: COMMERCIAL

## 2025-03-20 VITALS
SYSTOLIC BLOOD PRESSURE: 118 MMHG | HEIGHT: 66 IN | WEIGHT: 214 LBS | BODY MASS INDEX: 34.39 KG/M2 | HEART RATE: 60 BPM | OXYGEN SATURATION: 99 % | TEMPERATURE: 98 F | DIASTOLIC BLOOD PRESSURE: 80 MMHG

## 2025-03-20 DIAGNOSIS — M25.571 CHRONIC PAIN OF RIGHT ANKLE: ICD-10-CM

## 2025-03-20 DIAGNOSIS — E66.811 OBESITY (BMI 30.0-34.9): ICD-10-CM

## 2025-03-20 DIAGNOSIS — G89.29 CHRONIC PAIN OF RIGHT ANKLE: ICD-10-CM

## 2025-03-20 DIAGNOSIS — E78.5 HYPERLIPIDEMIA, UNSPECIFIED HYPERLIPIDEMIA TYPE: ICD-10-CM

## 2025-03-20 DIAGNOSIS — I10 ESSENTIAL HYPERTENSION: Primary | ICD-10-CM

## 2025-03-20 DIAGNOSIS — J01.00 ACUTE NON-RECURRENT MAXILLARY SINUSITIS: ICD-10-CM

## 2025-03-20 LAB
ALBUMIN SERPL BCP-MCNC: 4.3 G/DL (ref 3.5–5)
ALBUMIN/GLOB SERPL: 1.6 {RATIO}
ALP SERPL-CCNC: 117 U/L (ref 40–150)
ALT SERPL W P-5'-P-CCNC: 18 U/L
ANION GAP SERPL CALCULATED.3IONS-SCNC: 12 MMOL/L (ref 7–16)
AST SERPL W P-5'-P-CCNC: 19 U/L (ref 11–45)
BASOPHILS # BLD AUTO: 0.01 K/UL (ref 0–0.2)
BASOPHILS NFR BLD AUTO: 0.2 % (ref 0–1)
BILIRUB SERPL-MCNC: 0.4 MG/DL
BUN SERPL-MCNC: 12 MG/DL (ref 10–20)
BUN/CREAT SERPL: 16 (ref 6–20)
CALCIUM SERPL-MCNC: 9.7 MG/DL (ref 8.4–10.2)
CHLORIDE SERPL-SCNC: 105 MMOL/L (ref 98–107)
CHOLEST SERPL-MCNC: 155 MG/DL
CHOLEST/HDLC SERPL: 2 {RATIO}
CO2 SERPL-SCNC: 27 MMOL/L (ref 22–29)
CREAT SERPL-MCNC: 0.77 MG/DL (ref 0.55–1.02)
DIFFERENTIAL METHOD BLD: ABNORMAL
EGFR (NO RACE VARIABLE) (RUSH/TITUS): 94 ML/MIN/1.73M2
EOSINOPHIL # BLD AUTO: 0.26 K/UL (ref 0–0.5)
EOSINOPHIL NFR BLD AUTO: 5.7 % (ref 1–4)
ERYTHROCYTE [DISTWIDTH] IN BLOOD BY AUTOMATED COUNT: 13.5 % (ref 11.5–14.5)
GLOBULIN SER-MCNC: 2.7 G/DL (ref 2–4)
GLUCOSE SERPL-MCNC: 85 MG/DL (ref 74–100)
HCT VFR BLD AUTO: 42 % (ref 38–47)
HDLC SERPL-MCNC: 77 MG/DL (ref 35–60)
HGB BLD-MCNC: 13.2 G/DL (ref 12–16)
IMM GRANULOCYTES # BLD AUTO: 0.01 K/UL (ref 0–0.04)
IMM GRANULOCYTES NFR BLD: 0.2 % (ref 0–0.4)
LDLC SERPL CALC-MCNC: 71 MG/DL
LYMPHOCYTES # BLD AUTO: 1.78 K/UL (ref 1–4.8)
LYMPHOCYTES NFR BLD AUTO: 39 % (ref 27–41)
MCH RBC QN AUTO: 30.8 PG (ref 27–31)
MCHC RBC AUTO-ENTMCNC: 31.4 G/DL (ref 32–36)
MCV RBC AUTO: 97.9 FL (ref 80–96)
MONOCYTES # BLD AUTO: 0.41 K/UL (ref 0–0.8)
MONOCYTES NFR BLD AUTO: 9 % (ref 2–6)
MPC BLD CALC-MCNC: 10.2 FL (ref 9.4–12.4)
NEUTROPHILS # BLD AUTO: 2.09 K/UL (ref 1.8–7.7)
NEUTROPHILS NFR BLD AUTO: 45.9 % (ref 53–65)
NONHDLC SERPL-MCNC: 78 MG/DL
NRBC # BLD AUTO: 0 X10E3/UL
NRBC, AUTO (.00): 0 %
PLATELET # BLD AUTO: 297 K/UL (ref 150–400)
POTASSIUM SERPL-SCNC: 4.3 MMOL/L (ref 3.5–5.1)
PROT SERPL-MCNC: 7 G/DL (ref 6.4–8.3)
RBC # BLD AUTO: 4.29 M/UL (ref 4.2–5.4)
SODIUM SERPL-SCNC: 140 MMOL/L (ref 136–145)
TRIGL SERPL-MCNC: 33 MG/DL (ref 37–140)
VLDLC SERPL-MCNC: 7 MG/DL
WBC # BLD AUTO: 4.56 K/UL (ref 4.5–11)

## 2025-03-20 PROCEDURE — 3079F DIAST BP 80-89 MM HG: CPT | Mod: CPTII,,, | Performed by: FAMILY MEDICINE

## 2025-03-20 PROCEDURE — 80061 LIPID PANEL: CPT | Mod: ,,, | Performed by: CLINICAL MEDICAL LABORATORY

## 2025-03-20 PROCEDURE — 96372 THER/PROPH/DIAG INJ SC/IM: CPT | Mod: 59,,, | Performed by: FAMILY MEDICINE

## 2025-03-20 PROCEDURE — 3074F SYST BP LT 130 MM HG: CPT | Mod: CPTII,,, | Performed by: FAMILY MEDICINE

## 2025-03-20 PROCEDURE — 1159F MED LIST DOCD IN RCRD: CPT | Mod: CPTII,,, | Performed by: FAMILY MEDICINE

## 2025-03-20 PROCEDURE — 85025 COMPLETE CBC W/AUTO DIFF WBC: CPT | Mod: ,,, | Performed by: CLINICAL MEDICAL LABORATORY

## 2025-03-20 PROCEDURE — 99214 OFFICE O/P EST MOD 30 MIN: CPT | Mod: 25,,, | Performed by: FAMILY MEDICINE

## 2025-03-20 PROCEDURE — 3008F BODY MASS INDEX DOCD: CPT | Mod: CPTII,,, | Performed by: FAMILY MEDICINE

## 2025-03-20 PROCEDURE — 80053 COMPREHEN METABOLIC PANEL: CPT | Mod: ,,, | Performed by: CLINICAL MEDICAL LABORATORY

## 2025-03-20 RX ORDER — METHYLPREDNISOLONE ACETATE 80 MG/ML
40 INJECTION, SUSPENSION INTRA-ARTICULAR; INTRALESIONAL; INTRAMUSCULAR; SOFT TISSUE
Status: COMPLETED | OUTPATIENT
Start: 2025-03-20 | End: 2025-03-20

## 2025-03-20 RX ORDER — QUETIAPINE FUMARATE 25 MG/1
25 TABLET, FILM COATED ORAL NIGHTLY
Qty: 90 TABLET | Refills: 1 | Status: SHIPPED | OUTPATIENT
Start: 2025-03-20

## 2025-03-20 RX ORDER — DEXAMETHASONE SODIUM PHOSPHATE 4 MG/ML
4 INJECTION, SOLUTION INTRA-ARTICULAR; INTRALESIONAL; INTRAMUSCULAR; INTRAVENOUS; SOFT TISSUE
Status: COMPLETED | OUTPATIENT
Start: 2025-03-20 | End: 2025-03-20

## 2025-03-20 RX ORDER — PROPRANOLOL HYDROCHLORIDE 20 MG/1
20 TABLET ORAL NIGHTLY
Qty: 90 TABLET | Refills: 1 | Status: SHIPPED | OUTPATIENT
Start: 2025-03-20

## 2025-03-20 RX ORDER — HYDROCHLOROTHIAZIDE 12.5 MG/1
12.5 TABLET ORAL 2 TIMES DAILY
Qty: 180 TABLET | Refills: 1 | Status: SHIPPED | OUTPATIENT
Start: 2025-03-20

## 2025-03-20 RX ORDER — CEFTRIAXONE 1 G/1
1 INJECTION, POWDER, FOR SOLUTION INTRAMUSCULAR; INTRAVENOUS
Status: COMPLETED | OUTPATIENT
Start: 2025-03-20 | End: 2025-03-20

## 2025-03-20 RX ORDER — DEXCHLORPHENIRAMINE MALEATE, DEXTROMETHORPHAN HBR, PHENYLEPHRINE HCL 1; 10; 5 MG/5ML; MG/5ML; MG/5ML
10 SYRUP ORAL EVERY 6 HOURS PRN
Qty: 240 ML | Refills: 0 | Status: SHIPPED | OUTPATIENT
Start: 2025-03-20

## 2025-03-20 RX ORDER — ROSUVASTATIN CALCIUM 10 MG/1
10 TABLET, COATED ORAL NIGHTLY
Qty: 90 TABLET | Refills: 1 | Status: SHIPPED | OUTPATIENT
Start: 2025-03-20

## 2025-03-20 RX ORDER — AZITHROMYCIN 250 MG/1
TABLET, FILM COATED ORAL
Qty: 6 TABLET | Refills: 0 | Status: SHIPPED | OUTPATIENT
Start: 2025-03-20

## 2025-03-20 RX ADMIN — CEFTRIAXONE 1 G: 1 INJECTION, POWDER, FOR SOLUTION INTRAMUSCULAR; INTRAVENOUS at 10:03

## 2025-03-20 RX ADMIN — METHYLPREDNISOLONE ACETATE 40 MG: 80 INJECTION, SUSPENSION INTRA-ARTICULAR; INTRALESIONAL; INTRAMUSCULAR; SOFT TISSUE at 10:03

## 2025-03-20 RX ADMIN — DEXAMETHASONE SODIUM PHOSPHATE 4 MG: 4 INJECTION, SOLUTION INTRA-ARTICULAR; INTRALESIONAL; INTRAMUSCULAR; INTRAVENOUS; SOFT TISSUE at 10:03

## 2025-03-20 NOTE — PROGRESS NOTES
Hema Brown MD   [facility]  [address] [place] [address]     PATIENT NAME: Sree COLEMAN  : 1975  DATE: 3/20/25  MRN: [id number]      Billing Provider: Hema Brown MD  Level of Service: [place] OFFICE/OUTPT VISIT, EST, LEVL IV, 30-39 MIN  Patient PCP Information       Provider PCP Type    Hema Brown MD General            Reason for Visit / Chief Complaint: Hypertension (6 month check up, labs and med refills), Hyperlipidemia, Sinus Problem (C/o cough, sneezing, runny nose and postnasal drip), and Health Maintenance (There are no preventive care reminders to display for this patient. )         History of Present Illness / Problem Focused Workflow     Sree COELMAN presents to the clinic with Hypertension (6 month check up, labs and med refills), Hyperlipidemia, Sinus Problem (C/o cough, sneezing, runny nose and postnasal drip), and Health Maintenance (There are no preventive care reminders to display for this patient. )     HPI    Review of Systems     Review of Systems   Constitutional:  Negative for activity change, appetite change, fatigue and fever.   HENT:  Negative for nasal congestion, ear pain, hearing loss, sinus pressure/congestion and sore throat.    Respiratory:  Negative for cough, chest tightness and shortness of breath.    Cardiovascular:  Negative for chest pain and palpitations.   Gastrointestinal:  Negative for abdominal pain and fecal incontinence.   Genitourinary:  Negative for bladder incontinence and difficulty urinating.   Musculoskeletal:  Negative for arthralgias.   Integumentary:  Negative for rash.   Neurological:  Negative for dizziness and headaches.        Medical / Social / Family History     Past Medical History:   Diagnosis Date    Anxiety disorder, unspecified     Depression     Hypertension     Panic attack     Vasovagal attack 2024    Vasovagal attack occurred after office endocervical  curettage and required emergency room evaluation - labs within normal limits as well as CT scan of the neck and had patient was discharged home; patient has a history of vasovagal attacks according to daughter.       Past Surgical History:   Procedure Laterality Date    EXCISION OF MEDIAL MENISCUS OF KNEE Left 08/10/2021    Procedure: MENISCECTOMY, KNEE, partial MEDIAL;  Surgeon: Erick Alonso MD;  Location: [facility] OR;  Service: Orthopedics;  Laterality: Left;  PARTIAL MEDICAL MENISCECTOMY    KNEE ARTHROSCOPY W/ DEBRIDEMENT Left 08/10/2021    Procedure: ARTHROSCOPY, KNEE, WITH DEBRIDEMENT of patella;  Surgeon: Erick Alonso MD;  Location: [facility] OR;  Service: Orthopedics;  Laterality: Left;  shaving of patella    KNEE ARTHROSCOPY W/ PLICA EXCISION Left 08/10/2021    Procedure: EXCISION, PLICA, KNEE, ARTHROSCOPIC;  Surgeon: Erick Alonso MD;  Location: [facility] OR;  Service: Orthopedics;  Laterality: Left;  MSP EXCISION    RECONSTRUCTION OF ANTERIOR CRUCIATE LIGAMENT USING GRAFT Left 08/10/2021    Procedure: RECONSTRUCTION, KNEE, ACL, USING GRAFT TRANSFIX allograft;  Surgeon: Erick Alonso MD;  Location: [facility];  Service: Orthopedics;  Laterality: Left;    TUBAL LIGATION         Social History  Sree COLEMAN  reports that she has been smoking vaping w/o nicotine. She has never used smokeless tobacco. She reports current alcohol use of about 3.0 standard drinks of alcohol per week. She reports that she does not use drugs.    Family History  Sree COLEMAN  family history includes Hypertension in her mother and sister.    Medications and Allergies     Medications  Outpatient Medications Marked as Taking for the 3/20/25 encounter (Office Visit) with Hema Brown MD   Medication Sig Dispense Refill    amitriptyline (ELAVIL) 50 MG tablet Take 1 tablet (50 mg total) by mouth every evening. 90 tablet 3    benazepriL (LOTENSIN) 10 MG  tablet Take 10 mg by mouth once daily.      [DISCONTINUED] hydroCHLOROthiazide (HYDRODIURIL) 12.5 MG Tab Take 1 tablet (12.5 mg total) by mouth 2 (two) times a day. 180 tablet 1    [DISCONTINUED] propranoloL (INDERAL) 20 MG tablet Take 1 tablet (20 mg total) by mouth nightly. 90 tablet 1    [DISCONTINUED] QUEtiapine (SEROQUEL) 25 MG Tab Take 1 tablet (25 mg total) by mouth nightly. 30 tablet 0    [DISCONTINUED] rosuvastatin (CRESTOR) 10 MG tablet Take 1 tablet (10 mg total) by mouth every evening. 30 tablet 0       Allergies  Review of patient's allergies indicates:  No Known Allergies    Physical Examination     Vitals:    03/20/25 1022   BP: 118/80   Pulse: 60   Temp: 98 °F (36.7 °C)     Physical Exam  Constitutional:       General: She is not in acute distress.     Appearance: She is not ill-appearing.   HENT:      Head: Normocephalic and atraumatic.      Right Ear: Tympanic membrane and ear canal normal.      Left Ear: Tympanic membrane and ear canal normal.      Nose: Nose normal. No congestion or rhinorrhea.   Eyes:      Pupils: Pupils are equal, round, and reactive to light.   Cardiovascular:      Rate and Rhythm: Normal rate and regular rhythm.      Pulses: Normal pulses.      Heart sounds: No murmur heard.  Pulmonary:      Effort: No respiratory distress.      Breath sounds: No wheezing, rhonchi or rales.   Abdominal:      General: Bowel sounds are normal.      Palpations: Abdomen is soft.      Tenderness: There is no abdominal tenderness.      Hernia: No hernia is present.   Musculoskeletal:      Cervical back: Normal range of motion and neck supple.   Lymphadenopathy:      Cervical: No cervical adenopathy.   Skin:     General: Skin is warm and dry.   Neurological:      Mental Status: She is alert.   Psychiatric:         Behavior: Behavior normal.         Thought Content: Thought content normal.          Assessment and Plan (including Health Maintenance)   :    Plan:         There are no preventive care  reminders to display for this patient.    Problem List Items Addressed This Visit          Endocrine    Obesity (BMI 30.0-34.9)     Other Visit Diagnoses         Essential hypertension    -  Primary    Relevant Orders    CBC Auto Differential (Completed)    Comprehensive Metabolic Panel (Completed)    Lipid Panel (Completed)      Chronic pain of right ankle          Hyperlipidemia, unspecified hyperlipidemia type              Essential hypertension  -     CBC Auto Differential; Future; Expected date: 03/20/2025  -     Comprehensive Metabolic Panel; Future; Expected date: 03/20/2025  -     Lipid Panel; Future; Expected date: 03/20/2025    Chronic pain of right ankle    Obesity (BMI 30.0-34.9)    Hyperlipidemia, unspecified hyperlipidemia type    Other orders  -     rosuvastatin (CRESTOR) 10 MG tablet; Take 1 tablet (10 mg total) by mouth every evening.  Dispense: 90 tablet; Refill: 1  -     hydroCHLOROthiazide 12.5 MG Tab; Take 1 tablet (12.5 mg total) by mouth 2 (two) times a day.  Dispense: 180 tablet; Refill: 1  -     propranoloL (INDERAL) 20 MG tablet; Take 1 tablet (20 mg total) by mouth nightly.  Dispense: 90 tablet; Refill: 1  -     QUEtiapine (SEROQUEL) 25 MG Tab; Take 1 tablet (25 mg total) by mouth nightly.  Dispense: 90 tablet; Refill: 1  -     dexAMETHasone injection 4 mg  -     methylPREDNISolone acetate injection 40 mg  -     cefTRIAXone injection 1 g  -     dexchlorphen-phenylephrine-DM (POLYTUSSIN DM,DEXCHLORPHENRMN,) 1-5-10 mg/5 mL Syrp; Take 10 mLs by mouth every 6 (six) hours as needed (cough).  Dispense: 240 mL; Refill: 0  -     azithromycin (Z-RICO) 250 MG tablet; Take 2 tablets by mouth on day 1; Take 1 tablet by mouth on days 2-5  Dispense: 6 tablet; Refill: 0       Health Maintenance Topics with due status: Not Due       Topic Last Completion Date    Colorectal Cancer Screening 02/17/2023    Hemoglobin A1c (Diabetic Prevention Screening) 07/10/2023    Cervical Cancer Screening 04/09/2024     Mammogram 08/05/2024    TETANUS VACCINE 08/15/2024    Lipid Panel 03/20/2025    RSV Vaccine (Age 60+ and Pregnant patients) Not Due       Procedures     Future Appointments   Date Time Provider Department Center   7/10/2025  3:45 PM Hu Shea MD [facility]URO [facility]   9/11/2025  3:00 PM [facility] MAMMO1 [facility] MAMMO [facility]   9/11/2025  3:20 PM [facility] US2 [facility]        No follow-ups on file.       Signature:  Hema Brown MD  [facility]  [address] [place] [address]  [phone] Phone  [phone] Fax    Date of encounter: 3/20/25

## 2025-06-17 ENCOUNTER — HOSPITAL ENCOUNTER (OUTPATIENT)
Dept: RADIOLOGY | Facility: HOSPITAL | Age: 50
Discharge: HOME | End: 2025-06-17
Attending: NURSE PRACTITIONER
Payer: COMMERCIAL

## 2025-06-17 DIAGNOSIS — M25.371 RIGHT ANKLE INSTABILITY: ICD-10-CM

## 2025-06-17 DIAGNOSIS — M25.571 PAIN IN JOINT INVOLVING RIGHT ANKLE AND FOOT: ICD-10-CM

## 2025-06-17 PROCEDURE — A9575 INJ GADOTERATE MEGLUMI 0.1ML: HCPCS | Performed by: NURSE PRACTITIONER

## 2025-06-17 PROCEDURE — 73723 MRI JOINT LWR EXTR W/O&W/DYE: CPT | Mod: TC,RT

## 2025-06-17 PROCEDURE — 25500027 PHARM REV CODE 255 ALT 636 MCARE W HCPCS: Performed by: NURSE PRACTITIONER

## 2025-06-17 PROCEDURE — 73723 MRI JOINT LWR EXTR W/O&W/DYE: CPT | Mod: 26,RT,, | Performed by: RADIOLOGY

## 2025-06-17 RX ADMIN — GADOTERATE MEGLUMINE 18 ML: 376.9 INJECTION INTRAVENOUS at 11:06

## 2025-06-20 DIAGNOSIS — M25.571 RIGHT ANKLE PAIN: Primary | ICD-10-CM

## 2025-07-14 ENCOUNTER — TELEPHONE (OUTPATIENT)
Dept: ORTHOPEDICS | Facility: CLINIC | Age: 50
End: 2025-07-14
Payer: COMMERCIAL

## 2025-07-14 NOTE — TELEPHONE ENCOUNTER
NO ANSWER LEFT VM TO LET PT KNOW 330 IS OUR LATEST APPT.    Copied from CRM #[id number]. Topic: Appointments - Appointment Access  >> Jul 14, 2025 11:31 AM Sheridan wrote:  Who Called: Sree COLEMAN    Caller is requesting assistance/information from provider's office.    Pt is wanting to speak w nurse about appt on tomorrow. States she doesn't get off until 3:30 wanting to see if anything if available later. If not, will keep original       Preferred Method of Contact: Phone Call  Patient's Preferred Phone Number on File: [phone]   Best Call Back Number, if different:  Additional Information:

## 2025-07-15 ENCOUNTER — OFFICE VISIT (OUTPATIENT)
Dept: ORTHOPEDICS | Facility: CLINIC | Age: 50
End: 2025-07-15
Payer: COMMERCIAL

## 2025-07-15 VITALS
BODY MASS INDEX: 34.39 KG/M2 | DIASTOLIC BLOOD PRESSURE: 100 MMHG | HEART RATE: 103 BPM | WEIGHT: 214 LBS | HEIGHT: 66 IN | SYSTOLIC BLOOD PRESSURE: 147 MMHG

## 2025-07-15 DIAGNOSIS — M25.571 RIGHT ANKLE PAIN: ICD-10-CM

## 2025-07-15 PROCEDURE — 99214 OFFICE O/P EST MOD 30 MIN: CPT | Mod: PBBFAC | Performed by: ORTHOPAEDIC SURGERY

## 2025-07-15 PROCEDURE — 99999 PR PBB SHADOW E&M-EST. PATIENT-LVL IV: CPT | Mod: PBBFAC,,, | Performed by: ORTHOPAEDIC SURGERY

## 2025-07-15 RX ORDER — MELOXICAM 7.5 MG/1
7.5 TABLET ORAL DAILY
Qty: 30 TABLET | Refills: 1 | Status: SHIPPED | OUTPATIENT
Start: 2025-07-15

## 2025-07-15 NOTE — LETTER
July 15, 2025      [facility]  [address] [place] [address]  Phone: [phone]  Fax: [phone]       Patient: Sree COLEMAN   YOB: 1975  Date of Visit: 07/15/2025    To Whom It May Concern:    Kavitah COLEMAN  was at [facility] on 07/15/2025. The patient is still currently out of work. If you have any questions or concerns, or if I can be of further assistance, please do not hesitate to contact me.    Sincerely,    Kym Singletary III, M.D.

## 2025-07-15 NOTE — PROGRESS NOTES
CC:   Chief Complaint   Patient presents with    Right Ankle - Pain        PREVIOUS INFO:        HISTORY:   7/15/2025    Sree COLEMAN  is a 50 y.o. has a standing job at [email or web address] has severe right foot her ankle pain that has been going on for some least middle of last year she has really had no treatment she has had an MRI in June she really can not put her weight down she has sort on her tip toes on this right leg      PAST MEDICAL HISTORY:   Past Medical History:   Diagnosis Date    Anxiety disorder, unspecified     Depression     Hypertension     Panic attack     Vasovagal attack 04/24/2024    Vasovagal attack occurred after office endocervical curettage and required emergency room evaluation - labs within normal limits as well as CT scan of the neck and had patient was discharged home; patient has a history of vasovagal attacks according to daughter.          PAST SURGICAL HISTORY:   Past Surgical History:   Procedure Laterality Date    EXCISION OF MEDIAL MENISCUS OF KNEE Left 08/10/2021    Procedure: MENISCECTOMY, KNEE, partial MEDIAL;  Surgeon: Erick Alonso MD;  Location: [facility];  Service: Orthopedics;  Laterality: Left;  PARTIAL MEDICAL MENISCECTOMY    KNEE ARTHROSCOPY W/ DEBRIDEMENT Left 08/10/2021    Procedure: ARTHROSCOPY, KNEE, WITH DEBRIDEMENT of patella;  Surgeon: Erick Alonso MD;  Location: [facility];  Service: Orthopedics;  Laterality: Left;  shaving of patella    KNEE ARTHROSCOPY W/ PLICA EXCISION Left 08/10/2021    Procedure: EXCISION, PLICA, KNEE, ARTHROSCOPIC;  Surgeon: Erick Alonso MD;  Location: [facility];  Service: Orthopedics;  Laterality: Left;  MSP EXCISION    RECONSTRUCTION OF ANTERIOR CRUCIATE LIGAMENT USING GRAFT Left 08/10/2021    Procedure: RECONSTRUCTION, KNEE, ACL, USING GRAFT TRANSFIX allograft;  Surgeon: Erick Alonso MD;  Location: [facility];  Service: Orthopedics;  Laterality: Left;     TUBAL LIGATION            ALLERGIES: Review of patient's allergies indicates:  No Known Allergies     MEDICATIONS :  Current Medications[1]     SOCIAL HISTORY: Social History[2]     ROS    FAMILY HISTORY:   Family History   Problem Relation Name Age of Onset    Hypertension Mother      Hypertension Sister            PHYSICAL EXAM:   Vitals:    07/15/25 1553   BP: (!) 147/100   Pulse: 103               Body mass index is 34.54 kg/m².     In general, this is a well-developed, well-nourished female . The patient is alert, oriented and cooperative.      HEENT:  Normocephalic, atraumatic.  Extraocular movements are intact bilaterally.  The oropharynx is benign.       NECK:  Nontender with good range of motion.      PULMONARY: Respirations are even and non-labored.       CARDIOVASCULAR: Pulses regular by peripheral palpation.       ABDOMEN:  Soft, non-tender, non-distended.        EXTREMITIES:  Right leg she is neurovascularly intact but it maybe mild swelling but she has diffuse tenderness over posterior tib in the medial bones the calcaneus in talus region she has significant pes planus with a valgus heel she can not make an arch on this side secondary to pain can make an arch on the opposite foot    Ortho Exam      RADIOGRAPHIC FINDINGS:  MRI right ankle 06/17/2025     Impression:     Marrow edema about the talus and calcaneus as above possibly degenerative.  Subtle increased signal and thickening about the anterior talofibular ligament possibly chronic partial tear or strain.  Fluid about the tibiotalar joint.  There is fluid about the posterior tibial tendon which could represent some tendinosis.  No disruption or dislocation seen.  No obvious ligament disruption identified.  There is soft tissue edema about the anterolateral ankle and subcutaneous tissues with edema about the extensor retinaculum possible strain.        Electronically signed by:Con Barbour  Date:                                             06/18/2025  Time:                                           20:37    .  Right ankle x-rays August 2024 ankle mortise reduced normal bone mineralization no fracture dislocation appreciated      IMPRESSION:  Patient is going to very painful right foot she really can not put down flat when you get her to she has a valgus heel with a pes planus with a fairly diffuse tenderness medially and over the bones she has really had no treatment this has been going on since middle of last year at least no particular injury   She has marked bone marrow edema involving the calcaneus and talus with a collapsed arch    PLAN:  Mobic  Get her weight off this rolling scooter knee scooter  Walker  Nonweightbearing  Hold her from work  Cam walker boot  Prescription for an orthosis medial arch support lateral heel buttress        No follow-ups on file.         Brian Singletary III      (Subject to voice recognition error, transcription service not allowed)           [1]   Current Outpatient Medications:     amitriptyline (ELAVIL) 50 MG tablet, Take 1 tablet (50 mg total) by mouth every evening., Disp: 90 tablet, Rfl: 3    benazepriL (LOTENSIN) 10 MG tablet, Take 10 mg by mouth once daily., Disp: , Rfl:     hydroCHLOROthiazide 12.5 MG Tab, Take 1 tablet (12.5 mg total) by mouth 2 (two) times a day., Disp: 180 tablet, Rfl: 1    QUEtiapine (SEROQUEL) 25 MG Tab, Take 1 tablet (25 mg total) by mouth nightly., Disp: 90 tablet, Rfl: 1    rosuvastatin (CRESTOR) 10 MG tablet, Take 1 tablet (10 mg total) by mouth every evening., Disp: 90 tablet, Rfl: 1    azithromycin (Z-RICO) 250 MG tablet, Take 2 tablets by mouth on day 1; Take 1 tablet by mouth on days 2-5 (Patient not taking: Reported on 7/15/2025), Disp: 6 tablet, Rfl: 0    cefUROXime (CEFTIN) 250 MG tablet, Take 1 tablet (250 mg total) by mouth every 12 (twelve) hours. (Patient not taking: Reported on 1/9/2025), Disp: 20 tablet, Rfl: 0    clindamycin (CLEOCIN T) 1 % lotion, Apply topically every  morning. (Patient not taking: Reported on 8/15/2024), Disp: , Rfl:     dexchlorphen-phenylephrine-DM (POLYTUSSIN DM,DEXCHLORPHENRMN,) 1-5-10 mg/5 mL Syrp, Take 10 mLs by mouth every 6 (six) hours as needed (cough). (Patient not taking: Reported on 1/9/2025), Disp: 240 mL, Rfl: 0    dexchlorphen-phenylephrine-DM (POLYTUSSIN DM,DEXCHLORPHENRMN,) 1-5-10 mg/5 mL Syrp, Take 10 mLs by mouth every 6 (six) hours as needed (cough)., Disp: 240 mL, Rfl: 0    propranoloL (INDERAL) 20 MG tablet, Take 1 tablet (20 mg total) by mouth nightly., Disp: 90 tablet, Rfl: 1  [2]   Social History  Socioeconomic History    Marital status:    Tobacco Use    Smoking status: Every Day     Types: Vaping w/o nicotine    Smokeless tobacco: Never    Tobacco comments:     4 CIGARS A DAY   Substance and Sexual Activity    Alcohol use: Yes     Alcohol/week: 3.0 standard drinks of alcohol     Types: 3 Shots of liquor per week    Drug use: Never    Sexual activity: Yes     Partners: Male     Social Drivers of Health     Financial Resource Strain: Low Risk  (3/19/2025)    Overall Financial Resource Strain (CARDIA)     Difficulty of Paying Living Expenses: Not hard at all   Food Insecurity: No Food Insecurity (3/19/2025)    Hunger Vital Sign     Worried About Running Out of Food in the Last Year: Never true     Ran Out of Food in the Last Year: Never true   Transportation Needs: No Transportation Needs (3/19/2025)    PRAPARE - Transportation     Lack of Transportation (Medical): No     Lack of Transportation (Non-Medical): No   Physical Activity: Inactive (3/19/2025)    Exercise Vital Sign     Days of Exercise per Week: 0 days     Minutes of Exercise per Session: 0 min   Stress: No Stress Concern Present (3/19/2025)    [employer] - Occupational Stress Questionnaire     Feeling of Stress : Not at all   Housing Stability: Low Risk  (3/19/2025)    Housing Stability Vital Sign     Unable to Pay for Housing in the Last  Year: No     Number of Times Moved in the Last Year: 0     Homeless in the Last Year: No

## 2025-07-15 NOTE — LETTER
July 16, 2025      [facility]  [address] [place] [id number]  Phone: [phone]  Fax: [phone]       Patient: Sree COLEMAN   YOB: 1975  Date of Visit: 7/15/2025    To Whom It May Concern:    Kavitha COLEMAN  was at [facility] on 7/15/2025. The patient is currently out of work. We will re-evaluate 8/12/25. If you have any questions or concerns, or if I can be of further assistance, please do not hesitate to contact me.    Sincerely,    Lauren Singletary III, M.D.

## 2025-07-15 NOTE — LETTER
July 16, 2025      [facility]  [address] [place] [id number]  Phone: [phone]  Fax: [phone]       Patient: Sree COLEMAN   YOB: 1975  Date of Visit: 07/16/2025    To Whom It May Concern:    Kavitha COLEMAN  was at [facility] on 07/16/2025. The patient is currently out of work. We will re-evaluate 8/12/25. If you have any questions or concerns, or if I can be of further assistance, please do not hesitate to contact me.    Sincerely,    Kym Singletary III, M.D.

## 2025-08-08 ENCOUNTER — OFFICE VISIT (OUTPATIENT)
Dept: NEUROLOGY | Facility: CLINIC | Age: 50
End: 2025-08-08
Payer: COMMERCIAL

## 2025-08-08 VITALS
BODY MASS INDEX: 35.03 KG/M2 | SYSTOLIC BLOOD PRESSURE: 114 MMHG | HEIGHT: 66 IN | DIASTOLIC BLOOD PRESSURE: 72 MMHG | RESPIRATION RATE: 18 BRPM | WEIGHT: 218 LBS

## 2025-08-08 DIAGNOSIS — F41.9 ANXIETY AND DEPRESSION: Primary | ICD-10-CM

## 2025-08-08 DIAGNOSIS — F32.A ANXIETY AND DEPRESSION: Primary | ICD-10-CM

## 2025-08-08 PROCEDURE — 3008F BODY MASS INDEX DOCD: CPT | Mod: ,,, | Performed by: PSYCHIATRY & NEUROLOGY

## 2025-08-08 PROCEDURE — 99214 OFFICE O/P EST MOD 30 MIN: CPT | Mod: PBBFAC | Performed by: PSYCHIATRY & NEUROLOGY

## 2025-08-08 PROCEDURE — 1159F MED LIST DOCD IN RCRD: CPT | Mod: ,,, | Performed by: PSYCHIATRY & NEUROLOGY

## 2025-08-08 PROCEDURE — 3074F SYST BP LT 130 MM HG: CPT | Mod: ,,, | Performed by: PSYCHIATRY & NEUROLOGY

## 2025-08-08 PROCEDURE — 3078F DIAST BP <80 MM HG: CPT | Mod: ,,, | Performed by: PSYCHIATRY & NEUROLOGY

## 2025-08-08 PROCEDURE — 99999 PR PBB SHADOW E&M-EST. PATIENT-LVL IV: CPT | Mod: PBBFAC,,, | Performed by: PSYCHIATRY & NEUROLOGY

## 2025-08-08 PROCEDURE — 99214 OFFICE O/P EST MOD 30 MIN: CPT | Mod: S$PBB,,, | Performed by: PSYCHIATRY & NEUROLOGY

## 2025-08-08 RX ORDER — ERGOCALCIFEROL 1.25 MG/1
50000 CAPSULE ORAL
COMMUNITY
Start: 2025-06-05

## 2025-08-08 RX ORDER — TOPIRAMATE 25 MG/1
25 TABLET, FILM COATED ORAL
COMMUNITY
Start: 2025-06-03

## 2025-08-08 RX ORDER — SERTRALINE HYDROCHLORIDE 100 MG/1
TABLET, FILM COATED ORAL
COMMUNITY

## 2025-08-08 RX ORDER — AMITRIPTYLINE HYDROCHLORIDE 50 MG/1
50 TABLET, FILM COATED ORAL NIGHTLY
Qty: 90 TABLET | Refills: 3 | Status: SHIPPED | OUTPATIENT
Start: 2025-08-08

## 2025-08-08 NOTE — PROGRESS NOTES
HPI/Subjective/ROS      History of Present Illness    CHIEF COMPLAINT:  Patient presents for follow-up of chronic headaches and medication management.    HPI:  Patient reports chronic headaches with a recent exacerbation over the past 7 days causing significant distress. Her headache condition has been ongoing for years, with varying levels of control. In January, the dosage of Elavil (amitriptyline) was increased from 25mg to 50mg, which has helped with the headaches. She has sleep difficulties, not falling asleep until around 1 AM when taking the medication at 9 PM. She attributes these sleep issues to anxiety, stress, and tension, admitting to difficulty relaxing at night. She mentions being evaluated by a neurologist, though the timing of this appointment is not specified.      ROS:  General: -fever, -chills, -fatigue, -weight gain, -weight loss  Eyes: -vision changes, -redness, -discharge  ENT: -ear pain, -nasal congestion, -sore throat  Cardiovascular: -chest pain, -palpitations, -lower extremity edema  Respiratory: -cough, -shortness of breath  Gastrointestinal: -abdominal pain, -nausea, -vomiting, -diarrhea, -constipation, -blood in stool  Genitourinary: -dysuria, -hematuria, -frequency  Musculoskeletal: -joint pain, -muscle pain  Skin: -rash, -lesion  Neurological: +headache, -dizziness, -numbness, -tingling, +difficulty falling asleep  Psychiatric: +anxiety, +depression, -sleep difficulty          Allergies:  Patient has no known allergies.    Current Medications:  Encounter Medications[1]    Past Medical History:   Past Medical History:   Diagnosis Date    Anxiety disorder, unspecified     Depression     Hypertension     Panic attack     Vasovagal attack 04/24/2024    Vasovagal attack occurred after office endocervical curettage and required emergency room evaluation - labs within normal limits as well as CT scan of the neck and had patient was discharged home; patient has a history of vasovagal  "attacks according to daughter.       Surgical History:   Past Surgical History:   Procedure Laterality Date    EXCISION OF MEDIAL MENISCUS OF KNEE Left 08/10/2021    Procedure: MENISCECTOMY, KNEE, partial MEDIAL;  Surgeon: Erick Alonso MD;  Location: [facility];  Service: Orthopedics;  Laterality: Left;  PARTIAL MEDICAL MENISCECTOMY    KNEE ARTHROSCOPY W/ DEBRIDEMENT Left 08/10/2021    Procedure: ARTHROSCOPY, KNEE, WITH DEBRIDEMENT of patella;  Surgeon: Erick Alonso MD;  Location: [facility];  Service: Orthopedics;  Laterality: Left;  shaving of patella    KNEE ARTHROSCOPY W/ PLICA EXCISION Left 08/10/2021    Procedure: EXCISION, PLICA, KNEE, ARTHROSCOPIC;  Surgeon: Erick Alonso MD;  Location: [facility];  Service: Orthopedics;  Laterality: Left;  MSP EXCISION    RECONSTRUCTION OF ANTERIOR CRUCIATE LIGAMENT USING GRAFT Left 08/10/2021    Procedure: RECONSTRUCTION, KNEE, ACL, USING GRAFT TRANSFIX allograft;  Surgeon: Erick Alonso MD;  Location: [facility];  Service: Orthopedics;  Laterality: Left;    TUBAL LIGATION         Social History  Ms. COLEMAN  reports that she has been smoking vaping w/o nicotine. She has never used smokeless tobacco. She reports current alcohol use of about 3.0 standard drinks of alcohol per week. She reports that she does not use drugs.    Family History  Ms.'s COLEMAN family history includes Hypertension in her mother and sister.      Objective:   /72 (BP Location: Right arm, Patient Position: Sitting)   Resp 18   Ht 5' 6" (1.676 m)   Wt 98.9 kg (218 lb)   BMI 35.19 kg/m²          Physical Exam    General: No acute distress. Well-developed. Well-nourished.  Eyes: EOMI. Sclerae anicteric.  HENT: Normocephalic. Atraumatic. Nares patent. Moist oral mucosa.  Ears: Bilateral TMs clear. Bilateral EACs clear.  Cardiovascular: Regular rate. Regular rhythm. No murmurs. No rubs. No gallops. Normal S1, S2.  Respiratory: Normal " respiratory effort. Clear to auscultation bilaterally. No rales. No rhonchi. No wheezing.  Abdomen: Soft. Non-tender. Non-distended. Normoactive bowel sounds.  Musculoskeletal: No  obvious deformity.  Extremities: No lower extremity edema.  Neurological: Alert & oriented x3. No slurred speech. Normal gait.  Psychiatric: Normal mood. Normal affect. Good insight. Good judgment.  Skin: Warm. Dry. No rash.          Assessment:     Assessment & Plan    IMPRESSION:  - Assessed effectiveness of increased Elavil (amitriptyline) dosage for headache management; increased Elavil has been helpful for headache control over the past several months.  - Attributed sleep issues to psychogenic anxiety, stress, and tension.    INSOMNIA:  - Patient to focus on proper wind-down routine before bed to address sleep issues and reduce anxiety.  - Continue Elavil (amitriptyline) 50 mg, to be taken 1 hour before bed.  - Discontinue Seroquel and Topamax at night due to lack of efficacy.    DEPRESSION AND ANXIETY:  - Change Zoloft administration to morning for mood stabilization during the day.  - Referred to psychiatry for medication management of anxiety and depression.          Primary Diagnosis and ICD10  Anxiety and depression [F41.9, F32.A]    Plan:     There are no Patient Instructions on file for this visit.    Medications Discontinued During This Encounter   Medication Reason    dexchlorphen-phenylephrine-DM (POLYTUSSIN DM,DEXCHLORPHENRMN,) 1-5-10 mg/5 mL Syrp     dexchlorphen-phenylephrine-DM (POLYTUSSIN DM,DEXCHLORPHENRMN,) 1-5-10 mg/5 mL Syrp     propranoloL (INDERAL) 20 MG tablet     cefUROXime (CEFTIN) 250 MG tablet     clindamycin (CLEOCIN T) 1 % lotion     amitriptyline (ELAVIL) 50 MG tablet Reorder       Requested Prescriptions     Signed Prescriptions Disp Refills    amitriptyline (ELAVIL) 50 MG tablet 90 tablet 3     Sig: Take 1 tablet (50 mg total) by mouth every evening.       This note was generated with the assistance of  ambient listening technology. Verbal consent was obtained by the patient and accompanying visitor(s) for the recording of patient appointment to facilitate this note. I attest to having reviewed and edited the generated note for accuracy, though some syntax or spelling errors may persist. Please contact the author of this note for any clarification.            [1]   Outpatient Encounter Medications as of 8/8/2025   Medication Sig Dispense Refill    azithromycin (Z-RICO) 250 MG tablet Take 2 tablets by mouth on day 1; Take 1 tablet by mouth on days 2-5 6 tablet 0    benazepriL (LOTENSIN) 10 MG tablet Take 10 mg by mouth once daily.      ergocalciferol (ERGOCALCIFEROL) 50,000 unit Cap Take 50,000 Units by mouth every 7 days.      hydroCHLOROthiazide 12.5 MG Tab Take 1 tablet (12.5 mg total) by mouth 2 (two) times a day. 180 tablet 1    meloxicam (MOBIC) 7.5 MG tablet Take 1 tablet (7.5 mg total) by mouth once daily. 30 tablet 1    QUEtiapine (SEROQUEL) 25 MG Tab Take 1 tablet (25 mg total) by mouth nightly. 90 tablet 1    rosuvastatin (CRESTOR) 10 MG tablet Take 1 tablet (10 mg total) by mouth every evening. 90 tablet 1    sertraline (ZOLOFT) 100 MG tablet 1 tablet Orally Once a day; Duration: 30 day(s)      topiramate (TOPAMAX) 25 MG tablet Take 25 mg by mouth.      [DISCONTINUED] amitriptyline (ELAVIL) 50 MG tablet Take 1 tablet (50 mg total) by mouth every evening. 90 tablet 3    amitriptyline (ELAVIL) 50 MG tablet Take 1 tablet (50 mg total) by mouth every evening. 90 tablet 3    [DISCONTINUED] cefUROXime (CEFTIN) 250 MG tablet Take 1 tablet (250 mg total) by mouth every 12 (twelve) hours. (Patient not taking: Reported on 1/9/2025) 20 tablet 0    [DISCONTINUED] clindamycin (CLEOCIN T) 1 % lotion Apply topically every morning. (Patient not taking: Reported on 8/15/2024)      [DISCONTINUED] dexchlorphen-phenylephrine-DM (POLYTUSSIN DM,DEXCHLORPHENRMN,) 1-5-10 mg/5 mL Syrp Take 10 mLs by mouth every 6 (six) hours as  needed (cough). (Patient not taking: Reported on 1/9/2025) 240 mL 0    [DISCONTINUED] dexchlorphen-phenylephrine-DM (POLYTUSSIN DM,DEXCHLORPHENRMN,) 1-5-10 mg/5 mL Syrp Take 10 mLs by mouth every 6 (six) hours as needed (cough). 240 mL 0    [DISCONTINUED] propranoloL (INDERAL) 20 MG tablet Take 1 tablet (20 mg total) by mouth nightly. 90 tablet 1     No facility-administered encounter medications on file as of 8/8/2025.

## 2025-08-12 ENCOUNTER — OFFICE VISIT (OUTPATIENT)
Dept: ORTHOPEDICS | Facility: CLINIC | Age: 50
End: 2025-08-12
Payer: COMMERCIAL

## 2025-08-12 VITALS
HEART RATE: 94 BPM | DIASTOLIC BLOOD PRESSURE: 84 MMHG | HEIGHT: 66 IN | WEIGHT: 218.06 LBS | OXYGEN SATURATION: 100 % | SYSTOLIC BLOOD PRESSURE: 131 MMHG | BODY MASS INDEX: 35.04 KG/M2

## 2025-08-12 DIAGNOSIS — M25.571 ACUTE RIGHT ANKLE PAIN: Primary | ICD-10-CM

## 2025-08-12 PROCEDURE — 99214 OFFICE O/P EST MOD 30 MIN: CPT | Mod: PBBFAC | Performed by: ORTHOPAEDIC SURGERY

## 2025-08-12 PROCEDURE — 99999 PR PBB SHADOW E&M-EST. PATIENT-LVL IV: CPT | Mod: PBBFAC,,, | Performed by: ORTHOPAEDIC SURGERY

## 2025-08-12 PROCEDURE — 3079F DIAST BP 80-89 MM HG: CPT | Mod: ,,, | Performed by: ORTHOPAEDIC SURGERY

## 2025-08-12 PROCEDURE — 99213 OFFICE O/P EST LOW 20 MIN: CPT | Mod: S$PBB,,, | Performed by: ORTHOPAEDIC SURGERY

## 2025-08-12 PROCEDURE — 1159F MED LIST DOCD IN RCRD: CPT | Mod: ,,, | Performed by: ORTHOPAEDIC SURGERY

## 2025-08-12 PROCEDURE — 3075F SYST BP GE 130 - 139MM HG: CPT | Mod: ,,, | Performed by: ORTHOPAEDIC SURGERY

## 2025-08-12 PROCEDURE — 3008F BODY MASS INDEX DOCD: CPT | Mod: ,,, | Performed by: ORTHOPAEDIC SURGERY

## 2025-08-25 ENCOUNTER — PATIENT MESSAGE (OUTPATIENT)
Facility: HOSPITAL | Age: 50
End: 2025-08-25
Payer: COMMERCIAL